# Patient Record
Sex: FEMALE | Race: WHITE | NOT HISPANIC OR LATINO | Employment: FULL TIME | URBAN - METROPOLITAN AREA
[De-identification: names, ages, dates, MRNs, and addresses within clinical notes are randomized per-mention and may not be internally consistent; named-entity substitution may affect disease eponyms.]

---

## 2021-06-07 ENCOUNTER — OFFICE VISIT (OUTPATIENT)
Dept: OBSTETRICS AND GYNECOLOGY | Facility: CLINIC | Age: 41
End: 2021-06-07
Payer: COMMERCIAL

## 2021-06-07 DIAGNOSIS — Z30.9 ENCOUNTER FOR CONTRACEPTIVE MANAGEMENT, UNSPECIFIED TYPE: Primary | ICD-10-CM

## 2021-06-07 PROCEDURE — 96372 PR INJECTION,THERAP/PROPH/DIAG2ST, IM OR SUBCUT: ICD-10-PCS | Mod: S$GLB,ICN,, | Performed by: OBSTETRICS & GYNECOLOGY

## 2021-06-07 PROCEDURE — 96372 THER/PROPH/DIAG INJ SC/IM: CPT | Mod: S$GLB,ICN,, | Performed by: OBSTETRICS & GYNECOLOGY

## 2021-06-07 RX ORDER — MEDROXYPROGESTERONE ACETATE 150 MG/ML
150 INJECTION, SUSPENSION INTRAMUSCULAR
Status: DISCONTINUED | OUTPATIENT
Start: 2021-06-07 | End: 2024-02-27

## 2021-06-07 RX ADMIN — MEDROXYPROGESTERONE ACETATE 150 MG: 150 INJECTION, SUSPENSION INTRAMUSCULAR at 01:06

## 2021-08-27 ENCOUNTER — OFFICE VISIT (OUTPATIENT)
Dept: OBSTETRICS AND GYNECOLOGY | Facility: CLINIC | Age: 41
End: 2021-08-27
Payer: COMMERCIAL

## 2021-08-27 DIAGNOSIS — Z30.9 ENCOUNTER FOR CONTRACEPTIVE MANAGEMENT, UNSPECIFIED TYPE: Primary | ICD-10-CM

## 2021-08-27 RX ORDER — MEDROXYPROGESTERONE ACETATE 150 MG/ML
150 INJECTION, SUSPENSION INTRAMUSCULAR
Status: COMPLETED | OUTPATIENT
Start: 2021-08-27 | End: 2021-08-27

## 2021-08-27 RX ADMIN — MEDROXYPROGESTERONE ACETATE 150 MG: 150 INJECTION, SUSPENSION INTRAMUSCULAR at 09:08

## 2021-08-27 NOTE — PROGRESS NOTES
1mL of Depo administered in left deltoid. Pt tolerated injection well.--------------------------------------------------------------------------------------------------------------------------------------------------------------------  MARKUS Sylvester

## 2021-11-10 ENCOUNTER — TELEPHONE (OUTPATIENT)
Dept: OBSTETRICS AND GYNECOLOGY | Facility: CLINIC | Age: 41
End: 2021-11-10
Payer: COMMERCIAL

## 2021-11-15 ENCOUNTER — CLINICAL SUPPORT (OUTPATIENT)
Dept: OBSTETRICS AND GYNECOLOGY | Facility: CLINIC | Age: 41
End: 2021-11-15
Payer: COMMERCIAL

## 2021-11-15 PROCEDURE — 96372 PR INJECTION,THERAP/PROPH/DIAG2ST, IM OR SUBCUT: ICD-10-PCS | Mod: S$GLB,ICN,, | Performed by: OBSTETRICS & GYNECOLOGY

## 2021-11-15 PROCEDURE — 96372 THER/PROPH/DIAG INJ SC/IM: CPT | Mod: S$GLB,ICN,, | Performed by: OBSTETRICS & GYNECOLOGY

## 2021-11-15 RX ADMIN — MEDROXYPROGESTERONE ACETATE 150 MG: 150 INJECTION, SUSPENSION INTRAMUSCULAR at 12:11

## 2022-02-21 ENCOUNTER — OFFICE VISIT (OUTPATIENT)
Dept: OBSTETRICS AND GYNECOLOGY | Facility: CLINIC | Age: 42
End: 2022-02-21
Payer: COMMERCIAL

## 2022-02-21 VITALS
HEIGHT: 67 IN | BODY MASS INDEX: 41.28 KG/M2 | SYSTOLIC BLOOD PRESSURE: 122 MMHG | DIASTOLIC BLOOD PRESSURE: 74 MMHG | WEIGHT: 263 LBS

## 2022-02-21 DIAGNOSIS — Z01.419 ENCOUNTER FOR ANNUAL ROUTINE GYNECOLOGICAL EXAMINATION: Primary | ICD-10-CM

## 2022-02-21 DIAGNOSIS — Z30.42 ENCOUNTER FOR SURVEILLANCE OF INJECTABLE CONTRACEPTIVE: ICD-10-CM

## 2022-02-21 PROCEDURE — 99396 PREV VISIT EST AGE 40-64: CPT | Mod: 25,S$GLB,, | Performed by: OBSTETRICS & GYNECOLOGY

## 2022-02-21 PROCEDURE — 3078F DIAST BP <80 MM HG: CPT | Mod: S$GLB,,, | Performed by: OBSTETRICS & GYNECOLOGY

## 2022-02-21 PROCEDURE — 3008F PR BODY MASS INDEX (BMI) DOCUMENTED: ICD-10-PCS | Mod: S$GLB,,, | Performed by: OBSTETRICS & GYNECOLOGY

## 2022-02-21 PROCEDURE — 96372 PR INJECTION,THERAP/PROPH/DIAG2ST, IM OR SUBCUT: ICD-10-PCS | Mod: S$GLB,,, | Performed by: OBSTETRICS & GYNECOLOGY

## 2022-02-21 PROCEDURE — 99396 PR PREVENTIVE VISIT,EST,40-64: ICD-10-PCS | Mod: 25,S$GLB,, | Performed by: OBSTETRICS & GYNECOLOGY

## 2022-02-21 PROCEDURE — 1159F PR MEDICATION LIST DOCUMENTED IN MEDICAL RECORD: ICD-10-PCS | Mod: S$GLB,,, | Performed by: OBSTETRICS & GYNECOLOGY

## 2022-02-21 PROCEDURE — 3074F SYST BP LT 130 MM HG: CPT | Mod: S$GLB,,, | Performed by: OBSTETRICS & GYNECOLOGY

## 2022-02-21 PROCEDURE — 3008F BODY MASS INDEX DOCD: CPT | Mod: S$GLB,,, | Performed by: OBSTETRICS & GYNECOLOGY

## 2022-02-21 PROCEDURE — 1160F PR REVIEW ALL MEDS BY PRESCRIBER/CLIN PHARMACIST DOCUMENTED: ICD-10-PCS | Mod: S$GLB,,, | Performed by: OBSTETRICS & GYNECOLOGY

## 2022-02-21 PROCEDURE — 1159F MED LIST DOCD IN RCRD: CPT | Mod: S$GLB,,, | Performed by: OBSTETRICS & GYNECOLOGY

## 2022-02-21 PROCEDURE — 3078F PR MOST RECENT DIASTOLIC BLOOD PRESSURE < 80 MM HG: ICD-10-PCS | Mod: S$GLB,,, | Performed by: OBSTETRICS & GYNECOLOGY

## 2022-02-21 PROCEDURE — 96372 THER/PROPH/DIAG INJ SC/IM: CPT | Mod: S$GLB,,, | Performed by: OBSTETRICS & GYNECOLOGY

## 2022-02-21 PROCEDURE — 3074F PR MOST RECENT SYSTOLIC BLOOD PRESSURE < 130 MM HG: ICD-10-PCS | Mod: S$GLB,,, | Performed by: OBSTETRICS & GYNECOLOGY

## 2022-02-21 PROCEDURE — 1160F RVW MEDS BY RX/DR IN RCRD: CPT | Mod: S$GLB,,, | Performed by: OBSTETRICS & GYNECOLOGY

## 2022-02-21 RX ORDER — BUPROPION HYDROCHLORIDE 300 MG/1
TABLET ORAL
COMMUNITY
Start: 2021-12-08

## 2022-02-21 RX ORDER — METOPROLOL TARTRATE 25 MG/1
25 TABLET, FILM COATED ORAL DAILY
COMMUNITY
Start: 2021-12-08

## 2022-02-21 RX ADMIN — MEDROXYPROGESTERONE ACETATE 150 MG: 150 INJECTION, SUSPENSION INTRAMUSCULAR at 01:02

## 2022-02-21 NOTE — PROGRESS NOTES
"Subjective:       Margaret Veloz is a 41 y.o. female here for a routine exam.  Current complaints:  Weight gain.  Personal health questionnaire reviewed: yes.     Gynecologic History  No LMP recorded. Patient has had an injection.  Contraception: Depo-Provera injections  Last Pap:  . Results were: normal  Last mammogram:  . Results were: normal    Obstetric History  OB History    Para Term  AB Living   2 1     1 1   SAB IAB Ectopic Multiple Live Births   1              # Outcome Date GA Lbr Homer/2nd Weight Sex Delivery Anes PTL Lv   2 SAB            1 Para      Vag-Spont            The following portions of the patient's history were reviewed and updated as appropriate: allergies, current medications, past family history, past medical history, past social history, past surgical history and problem list.    Review of Systems   ROS: (except as stated in HPI)  GENERAL: Denies weight gain or weight loss. Feeling well overall.   SKIN: Denies rash or lesions.   HEAD: Denies head injury or headache.   NODES: Denies enlarged lymph nodes.   CHEST: Denies chest pain or shortness of breath.   CARDIOVASCULAR: Denies palpitations or left sided chest pain.   ABDOMEN: No abdominal pain, constipation, diarrhea, nausea, vomiting or rectal bleeding.   URINARY: No frequency, dysuria, hematuria, or burning on urination.  REPRODUCTIVE: See HPI.   BREASTS: The patient performs breast self-examination and denies pain, lumps, or nipple discharge.   HEMATOLOGIC: No easy bruisability or excessive bleeding.   MUSCULOSKELETAL: Denies joint pain or swelling.   NEUROLOGIC: Denies syncope or weakness.   PSYCHIATRIC: Denies depression, anxiety or mood swings.         Objective:        /74 (BP Location: Right arm, Patient Position: Sitting)   Ht 5' 7" (1.702 m)   Wt 119.3 kg (263 lb)   BMI 41.19 kg/m²     General Appearance:    Alert, cooperative, no distress, appears stated age   Head:    Normocephalic, without " obvious abnormality, atraumatic   Eyes:    PERRL, conjunctiva/corneas clear, EOM's intact, fundi     benign, both eyes   Ears:    Normal TM's and external ear canals, both ears   Nose:   Nares normal, septum midline, mucosa normal, no drainage    or sinus tenderness   Throat:   Lips, mucosa, and tongue normal; teeth and gums normal   Neck:   Supple, symmetrical, trachea midline, no adenopathy;     thyroid:  no enlargement/tenderness/nodules; no carotid    bruit or JVD   Back:     Symmetric, no curvature, ROM normal, no CVA tenderness   Lungs:     Clear to auscultation bilaterally, respirations unlabored   Chest Wall:    No tenderness or deformity    Heart:    Regular rate and rhythm, S1 and S2 normal, no murmur, rub   or gallop   Breast Exam:    No tenderness, masses, or nipple abnormality   Abdomen:     Soft, non-tender, bowel sounds active all four quadrants,     no masses, no organomegaly   Genitalia:    Normal female without lesion, discharge or tenderness      Pelvic  Vv normal mucosa, without discharge or lesions  Cervix clear  Uterus anteverted normal size  Adnexa normal, without masses     Rectal:  Deferred   Extremities:   Extremities normal, atraumatic, no cyanosis or edema   Pulses:   2+ and symmetric all extremities   Skin:   Skin color, texture, turgor normal, no rashes or lesions   Lymph nodes:   Cervical, supraclavicular, and axillary nodes normal   Neurologic:   CNII-XII intact, normal strength, sensation and reflexes     throughout         Assessment:      Healthy female exam.      Plan:      Mammogram ordered.   Discussed contraceptive options.  Patient wishes to remain on Depo-Provera  Discussed weight gain.  She will discuss options with her PCP  Return 1 year or as needed

## 2022-05-23 ENCOUNTER — CLINICAL SUPPORT (OUTPATIENT)
Dept: OBSTETRICS AND GYNECOLOGY | Facility: CLINIC | Age: 42
End: 2022-05-23
Payer: COMMERCIAL

## 2022-05-23 PROCEDURE — 96372 PR INJECTION,THERAP/PROPH/DIAG2ST, IM OR SUBCUT: ICD-10-PCS | Mod: S$GLB,,, | Performed by: OBSTETRICS & GYNECOLOGY

## 2022-05-23 PROCEDURE — 96372 THER/PROPH/DIAG INJ SC/IM: CPT | Mod: S$GLB,,, | Performed by: OBSTETRICS & GYNECOLOGY

## 2022-05-23 RX ADMIN — MEDROXYPROGESTERONE ACETATE 150 MG: 150 INJECTION, SUSPENSION INTRAMUSCULAR at 03:05

## 2022-08-23 ENCOUNTER — CLINICAL SUPPORT (OUTPATIENT)
Dept: OBSTETRICS AND GYNECOLOGY | Facility: CLINIC | Age: 42
End: 2022-08-23
Payer: COMMERCIAL

## 2022-08-23 PROCEDURE — 96372 THER/PROPH/DIAG INJ SC/IM: CPT | Mod: S$GLB,,, | Performed by: OBSTETRICS & GYNECOLOGY

## 2022-08-23 PROCEDURE — 96372 PR INJECTION,THERAP/PROPH/DIAG2ST, IM OR SUBCUT: ICD-10-PCS | Mod: S$GLB,,, | Performed by: OBSTETRICS & GYNECOLOGY

## 2022-08-23 RX ADMIN — MEDROXYPROGESTERONE ACETATE 150 MG: 150 INJECTION, SUSPENSION INTRAMUSCULAR at 09:08

## 2022-11-23 ENCOUNTER — CLINICAL SUPPORT (OUTPATIENT)
Dept: OBSTETRICS AND GYNECOLOGY | Facility: CLINIC | Age: 42
End: 2022-11-23

## 2022-11-23 DIAGNOSIS — Z30.42 ENCOUNTER FOR SURVEILLANCE OF INJECTABLE CONTRACEPTIVE: Primary | ICD-10-CM

## 2022-11-23 PROCEDURE — 96372 PR INJECTION,THERAP/PROPH/DIAG2ST, IM OR SUBCUT: ICD-10-PCS | Mod: S$GLB,,, | Performed by: OBSTETRICS & GYNECOLOGY

## 2022-11-23 PROCEDURE — 96372 THER/PROPH/DIAG INJ SC/IM: CPT | Mod: S$GLB,,, | Performed by: OBSTETRICS & GYNECOLOGY

## 2022-11-23 RX ADMIN — MEDROXYPROGESTERONE ACETATE 150 MG: 150 INJECTION, SUSPENSION INTRAMUSCULAR at 09:11

## 2023-02-22 ENCOUNTER — CLINICAL SUPPORT (OUTPATIENT)
Dept: OBSTETRICS AND GYNECOLOGY | Facility: CLINIC | Age: 43
End: 2023-02-22

## 2023-02-22 PROCEDURE — 96372 THER/PROPH/DIAG INJ SC/IM: CPT | Mod: S$GLB,,, | Performed by: OBSTETRICS & GYNECOLOGY

## 2023-02-22 PROCEDURE — 96372 PR INJECTION,THERAP/PROPH/DIAG2ST, IM OR SUBCUT: ICD-10-PCS | Mod: S$GLB,,, | Performed by: OBSTETRICS & GYNECOLOGY

## 2023-02-22 RX ADMIN — MEDROXYPROGESTERONE ACETATE 150 MG: 150 INJECTION, SUSPENSION INTRAMUSCULAR at 09:02

## 2023-05-24 ENCOUNTER — CLINICAL SUPPORT (OUTPATIENT)
Dept: OBSTETRICS AND GYNECOLOGY | Facility: CLINIC | Age: 43
End: 2023-05-24

## 2023-05-24 PROCEDURE — 96372 PR INJECTION,THERAP/PROPH/DIAG2ST, IM OR SUBCUT: ICD-10-PCS | Mod: S$GLB,,, | Performed by: OBSTETRICS & GYNECOLOGY

## 2023-05-24 PROCEDURE — 96372 THER/PROPH/DIAG INJ SC/IM: CPT | Mod: S$GLB,,, | Performed by: OBSTETRICS & GYNECOLOGY

## 2023-05-24 RX ADMIN — MEDROXYPROGESTERONE ACETATE 150 MG: 150 INJECTION, SUSPENSION INTRAMUSCULAR at 09:05

## 2023-08-23 ENCOUNTER — CLINICAL SUPPORT (OUTPATIENT)
Dept: OBSTETRICS AND GYNECOLOGY | Facility: CLINIC | Age: 43
End: 2023-08-23

## 2023-08-23 DIAGNOSIS — Z30.42 DEPO-PROVERA CONTRACEPTIVE STATUS: Primary | ICD-10-CM

## 2023-08-23 PROCEDURE — 96372 PR INJECTION,THERAP/PROPH/DIAG2ST, IM OR SUBCUT: ICD-10-PCS | Mod: S$GLB,,, | Performed by: OBSTETRICS & GYNECOLOGY

## 2023-08-23 PROCEDURE — 96372 THER/PROPH/DIAG INJ SC/IM: CPT | Mod: S$GLB,,, | Performed by: OBSTETRICS & GYNECOLOGY

## 2023-08-23 RX ADMIN — MEDROXYPROGESTERONE ACETATE 150 MG: 150 INJECTION, SUSPENSION INTRAMUSCULAR at 09:08

## 2023-08-23 NOTE — PROGRESS NOTES
Patient presents for scheduled Depo-Provera injection.  UPT not done.  Medroxyprogesterone 150 mg IM given per provider order.  See MAR for further details.  Patient tolerated well.  No signs or symptoms of adverse reaction after 15 minutes.  Patient instructed to return for next dose in 3 months.  Pt to call back and scheduled annual visit. She no longer has insurance and wants to get everything situated before scheduling

## 2023-11-27 ENCOUNTER — CLINICAL SUPPORT (OUTPATIENT)
Dept: OBSTETRICS AND GYNECOLOGY | Facility: CLINIC | Age: 43
End: 2023-11-27

## 2023-11-27 DIAGNOSIS — Z30.42 ENCOUNTER FOR SURVEILLANCE OF INJECTABLE CONTRACEPTIVE: Primary | ICD-10-CM

## 2023-11-27 PROCEDURE — 96372 PR INJECTION,THERAP/PROPH/DIAG2ST, IM OR SUBCUT: ICD-10-PCS | Mod: S$GLB,,, | Performed by: OBSTETRICS & GYNECOLOGY

## 2023-11-27 PROCEDURE — 96372 THER/PROPH/DIAG INJ SC/IM: CPT | Mod: S$GLB,,, | Performed by: OBSTETRICS & GYNECOLOGY

## 2023-11-27 RX ADMIN — MEDROXYPROGESTERONE ACETATE 150 MG: 150 INJECTION, SUSPENSION INTRAMUSCULAR at 11:11

## 2023-11-27 NOTE — PROGRESS NOTES
Patient presents for scheduled Depo-Provera injection.  UPT not done, Pt on her period.  Medroxyprogesterone 150 mg IM given per provider order.  See MAR for further details.  Patient tolerated well.  No signs or symptoms of adverse reaction after 15 minutes.  Patient instructed to return for next dose in 3 months.

## 2024-07-02 ENCOUNTER — OFFICE VISIT (OUTPATIENT)
Dept: FAMILY MEDICINE | Facility: CLINIC | Age: 44
End: 2024-07-02
Payer: COMMERCIAL

## 2024-07-02 ENCOUNTER — LAB VISIT (OUTPATIENT)
Dept: LAB | Facility: CLINIC | Age: 44
End: 2024-07-02
Payer: COMMERCIAL

## 2024-07-02 VITALS
HEIGHT: 67 IN | WEIGHT: 238 LBS | BODY MASS INDEX: 37.35 KG/M2 | OXYGEN SATURATION: 99 % | HEART RATE: 83 BPM | SYSTOLIC BLOOD PRESSURE: 118 MMHG | DIASTOLIC BLOOD PRESSURE: 68 MMHG

## 2024-07-02 DIAGNOSIS — E11.65 TYPE 2 DIABETES MELLITUS WITH HYPERGLYCEMIA, WITHOUT LONG-TERM CURRENT USE OF INSULIN: ICD-10-CM

## 2024-07-02 DIAGNOSIS — Z76.89 ENCOUNTER TO ESTABLISH CARE: Primary | ICD-10-CM

## 2024-07-02 DIAGNOSIS — Z11.59 NEED FOR HEPATITIS C SCREENING TEST: ICD-10-CM

## 2024-07-02 DIAGNOSIS — F90.0 ATTENTION DEFICIT HYPERACTIVITY DISORDER (ADHD), PREDOMINANTLY INATTENTIVE TYPE: ICD-10-CM

## 2024-07-02 DIAGNOSIS — Z11.4 ENCOUNTER FOR SCREENING FOR HIV: ICD-10-CM

## 2024-07-02 DIAGNOSIS — R00.0 TACHYCARDIA: ICD-10-CM

## 2024-07-02 DIAGNOSIS — Z76.89 ENCOUNTER TO ESTABLISH CARE: ICD-10-CM

## 2024-07-02 LAB
AMP AMPHETAMINE 1000 NM/ML POC: ABNORMAL
BAR BARBITURATES 300 NG/ML POC: NEGATIVE
BUP BUPRENORPHINE 10 NG/ML POC: NEGATIVE
BZO BENZODIAZEPINES 300 NG/ML POC: NEGATIVE
COC COCAINE 300 NG/ML POC: NEGATIVE
CREATININE (CR) POC: NEGATIVE
CTP QC/QA: YES
MET METHAMPHETAMINE 1000 NG/ML POC: NEGATIVE
MOP/OPI300 MORPHINE 300 NG/ML POC: NEGATIVE
MTD METHADONE 300 NG/ML POC: NEGATIVE
OXIDANT (OX) POC: NEGATIVE
OXY OXYCODONE 100 NG/ML POC: NEGATIVE
SPECIFIC GRAVITY (SG) POC: 1
TEMPERATURE (°F) POC: 96
THC MARIJUANA 50 NG/ML POC: NEGATIVE

## 2024-07-02 PROCEDURE — 80053 COMPREHEN METABOLIC PANEL: CPT | Performed by: STUDENT IN AN ORGANIZED HEALTH CARE EDUCATION/TRAINING PROGRAM

## 2024-07-02 PROCEDURE — 87389 HIV-1 AG W/HIV-1&-2 AB AG IA: CPT | Performed by: STUDENT IN AN ORGANIZED HEALTH CARE EDUCATION/TRAINING PROGRAM

## 2024-07-02 PROCEDURE — 83036 HEMOGLOBIN GLYCOSYLATED A1C: CPT | Performed by: STUDENT IN AN ORGANIZED HEALTH CARE EDUCATION/TRAINING PROGRAM

## 2024-07-02 PROCEDURE — 99214 OFFICE O/P EST MOD 30 MIN: CPT | Mod: S$GLB,,, | Performed by: STUDENT IN AN ORGANIZED HEALTH CARE EDUCATION/TRAINING PROGRAM

## 2024-07-02 PROCEDURE — 80305 DRUG TEST PRSMV DIR OPT OBS: CPT | Mod: QW,S$GLB,, | Performed by: STUDENT IN AN ORGANIZED HEALTH CARE EDUCATION/TRAINING PROGRAM

## 2024-07-02 PROCEDURE — 1159F MED LIST DOCD IN RCRD: CPT | Mod: CPTII,S$GLB,, | Performed by: STUDENT IN AN ORGANIZED HEALTH CARE EDUCATION/TRAINING PROGRAM

## 2024-07-02 PROCEDURE — 80061 LIPID PANEL: CPT | Performed by: STUDENT IN AN ORGANIZED HEALTH CARE EDUCATION/TRAINING PROGRAM

## 2024-07-02 PROCEDURE — 36415 COLL VENOUS BLD VENIPUNCTURE: CPT | Mod: ,,, | Performed by: STUDENT IN AN ORGANIZED HEALTH CARE EDUCATION/TRAINING PROGRAM

## 2024-07-02 PROCEDURE — 1160F RVW MEDS BY RX/DR IN RCRD: CPT | Mod: CPTII,S$GLB,, | Performed by: STUDENT IN AN ORGANIZED HEALTH CARE EDUCATION/TRAINING PROGRAM

## 2024-07-02 PROCEDURE — 86803 HEPATITIS C AB TEST: CPT | Performed by: STUDENT IN AN ORGANIZED HEALTH CARE EDUCATION/TRAINING PROGRAM

## 2024-07-02 PROCEDURE — 3008F BODY MASS INDEX DOCD: CPT | Mod: CPTII,S$GLB,, | Performed by: STUDENT IN AN ORGANIZED HEALTH CARE EDUCATION/TRAINING PROGRAM

## 2024-07-02 PROCEDURE — 3074F SYST BP LT 130 MM HG: CPT | Mod: CPTII,S$GLB,, | Performed by: STUDENT IN AN ORGANIZED HEALTH CARE EDUCATION/TRAINING PROGRAM

## 2024-07-02 PROCEDURE — 3078F DIAST BP <80 MM HG: CPT | Mod: CPTII,S$GLB,, | Performed by: STUDENT IN AN ORGANIZED HEALTH CARE EDUCATION/TRAINING PROGRAM

## 2024-07-02 RX ORDER — FOLIC ACID 0.8 MG
800 TABLET ORAL DAILY
COMMUNITY

## 2024-07-02 RX ORDER — DEXTROAMPHETAMINE SACCHARATE, AMPHETAMINE ASPARTATE MONOHYDRATE, DEXTROAMPHETAMINE SULFATE AND AMPHETAMINE SULFATE 2.5; 2.5; 2.5; 2.5 MG/1; MG/1; MG/1; MG/1
10 CAPSULE, EXTENDED RELEASE ORAL EVERY MORNING
Qty: 30 CAPSULE | Refills: 0 | Status: SHIPPED | OUTPATIENT
Start: 2024-08-02

## 2024-07-02 RX ORDER — DEXTROAMPHETAMINE SACCHARATE, AMPHETAMINE ASPARTATE MONOHYDRATE, DEXTROAMPHETAMINE SULFATE AND AMPHETAMINE SULFATE 2.5; 2.5; 2.5; 2.5 MG/1; MG/1; MG/1; MG/1
10 CAPSULE, EXTENDED RELEASE ORAL EVERY MORNING
COMMUNITY
End: 2024-07-02 | Stop reason: SDUPTHER

## 2024-07-02 RX ORDER — SAXAGLIPTIN AND METFORMIN HYDROCHLORIDE 1000; 2.5 MG/1; MG/1
1 TABLET, FILM COATED, EXTENDED RELEASE ORAL 2 TIMES DAILY
Qty: 180 TABLET | Refills: 3 | Status: SHIPPED | OUTPATIENT
Start: 2024-07-02

## 2024-07-02 RX ORDER — DEXTROAMPHETAMINE SACCHARATE, AMPHETAMINE ASPARTATE MONOHYDRATE, DEXTROAMPHETAMINE SULFATE AND AMPHETAMINE SULFATE 2.5; 2.5; 2.5; 2.5 MG/1; MG/1; MG/1; MG/1
10 CAPSULE, EXTENDED RELEASE ORAL EVERY MORNING
Qty: 30 CAPSULE | Refills: 0 | Status: SHIPPED | OUTPATIENT
Start: 2024-09-02

## 2024-07-02 RX ORDER — LANOLIN ALCOHOL/MO/W.PET/CERES
100 CREAM (GRAM) TOPICAL DAILY
COMMUNITY

## 2024-07-02 RX ORDER — MULTIVITAMIN
1 TABLET ORAL DAILY
COMMUNITY

## 2024-07-02 RX ORDER — ASPIRIN 325 MG
325 TABLET, DELAYED RELEASE (ENTERIC COATED) ORAL DAILY
COMMUNITY

## 2024-07-02 RX ORDER — METOPROLOL TARTRATE 25 MG/1
25 TABLET, FILM COATED ORAL DAILY
Qty: 90 TABLET | Refills: 3 | Status: SHIPPED | OUTPATIENT
Start: 2024-07-02

## 2024-07-02 RX ORDER — DEXTROAMPHETAMINE SACCHARATE, AMPHETAMINE ASPARTATE MONOHYDRATE, DEXTROAMPHETAMINE SULFATE AND AMPHETAMINE SULFATE 2.5; 2.5; 2.5; 2.5 MG/1; MG/1; MG/1; MG/1
10 CAPSULE, EXTENDED RELEASE ORAL EVERY MORNING
Qty: 30 CAPSULE | Refills: 0 | Status: SHIPPED | OUTPATIENT
Start: 2024-07-02

## 2024-07-02 RX ORDER — SAXAGLIPTIN AND METFORMIN HYDROCHLORIDE 1000; 2.5 MG/1; MG/1
1 TABLET, FILM COATED, EXTENDED RELEASE ORAL 2 TIMES DAILY
COMMUNITY
End: 2024-07-02

## 2024-07-02 NOTE — PROGRESS NOTES
"  [facility]    [facility]  [address]  [place] [address]    Subjective     Patient ID: Margaret Veloz is a 44 y.o. female who comes to the clinic to establish care.    Chief Complaint: Est Care    T2DM - last A1c of 11.1, takes saxagliptan-meformin 2.5mg-1000mg    Chronic knee pain - takes ASA 325mg daily    B12 deficiency - takes b12 supplement    ADHD - takes adderall 10mg XR daily    Birth control - takes depo every 3 months    ROS negative unless stated above       Objective     Vitals:    07/02/24 1544   BP: 118/68   Pulse: 83   SpO2: 99%   Weight: 108 kg (238 lb)   Height: 5' 7" (1.702 m)        Wt Readings from Last 3 Encounters:   07/02/24 1544 108 kg (238 lb)   02/27/24 1121 117 kg (258 lb)   02/21/22 0944 119.3 kg (263 lb)        Physical Exam  Vitals reviewed.   Constitutional:       Appearance: Normal appearance. She is obese.   HENT:      Head: Normocephalic and atraumatic.   Eyes:      Extraocular Movements: Extraocular movements intact.      Pupils: Pupils are equal, round, and reactive to light.   Cardiovascular:      Rate and Rhythm: Normal rate.   Pulmonary:      Effort: Pulmonary effort is normal. No respiratory distress.   Musculoskeletal:         General: Normal range of motion.      Cervical back: Normal range of motion and neck supple.   Skin:     General: Skin is dry.      Capillary Refill: Capillary refill takes less than 2 seconds.   Neurological:      General: No focal deficit present.      Mental Status: She is alert and oriented to person, place, and time. Mental status is at baseline.   Psychiatric:         Mood and Affect: Mood normal.         Behavior: Behavior normal.         Thought Content: Thought content normal.         Current Outpatient Medications   Medication Instructions    aspirin (ECOTRIN) 325 mg, Oral, Daily    cyanocobalamin (VITAMIN B-12) 100 mcg, Oral, Daily    dextroamphetamine-amphetamine (ADDERALL " XR) 10 MG 24 hr capsule 10 mg, Oral, Every morning    [START ON 8/2/2024] dextroamphetamine-amphetamine (ADDERALL XR) 10 MG 24 hr capsule 10 mg, Oral, Every morning    [START ON 9/2/2024] dextroamphetamine-amphetamine (ADDERALL XR) 10 MG 24 hr capsule 10 mg, Oral, Every morning    folic acid (FOLVITE) 800 mcg, Oral, Daily    medroxyPROGESTERone (DEPO-PROVERA) 150 mg, Intramuscular, Every 3 months, Please include needles    metoprolol tartrate (LOPRESSOR) 25 mg, Oral, Daily    multivitamin (ONE DAILY MULTIVITAMIN) per tablet 1 tablet, Oral, Daily    SAXagliptin-metformin (KOMBIGLYZE XR) 2.5-1,000 mg TM24 1 tablet, Oral, 2 times daily           Assessment and Plan     1. Encounter to establish care  -     Lipid panel; Future; Expected date: 07/02/2024  -     Hemoglobin A1c; Future; Expected date: 07/02/2024  -     Comprehensive metabolic panel; Future; Expected date: 07/02/2024    2. Encounter for screening for HIV  -     HIV 1/2 Ag/Ab (4th Gen); Future; Expected date: 07/02/2024    3. Need for hepatitis C screening test  -     Hepatitis C antibody; Future; Expected date: 07/02/2024    4. Attention deficit hyperactivity disorder (ADHD), predominantly inattentive type  -     dextroamphetamine-amphetamine (ADDERALL XR) 10 MG 24 hr capsule; Take 1 capsule (10 mg total) by mouth every morning.  Dispense: 30 capsule; Refill: 0  -     dextroamphetamine-amphetamine (ADDERALL XR) 10 MG 24 hr capsule; Take 1 capsule (10 mg total) by mouth every morning.  Dispense: 30 capsule; Refill: 0  -     dextroamphetamine-amphetamine (ADDERALL XR) 10 MG 24 hr capsule; Take 1 capsule (10 mg total) by mouth every morning.  Dispense: 30 capsule; Refill: 0  -     POCT Urine Drug Screen (With BUP)    5. Type 2 diabetes mellitus with hyperglycemia, without long-term current use of insulin  -     Ambulatory referral/consult to Diabetes Education; Future; Expected date: 07/09/2024  -     SAXagliptin-metformin (KOMBIGLYZE XR) 2.5-1,000 mg TM24;  Take 1 tablet by mouth 2 (two) times a day.  Dispense: 180 tablet; Refill: 3    6. Tachycardia  -     metoprolol tartrate (LOPRESSOR) 25 MG tablet; Take 1 tablet (25 mg total) by mouth once daily.  Dispense: 90 tablet; Refill: 3        Here to establish care. Also needs refills    For T2DM, checking A1c, doubling saxagliptan-metformin dose given poor control, diabetes educator ordered    For tachycardia, continue metoprolol    For ADHD, refilling adderall. PDMP checked w/ no concerning findings. UDS today    Labs as above    RTC in 3 months for adderall and diabetes control         I encouraged the patient to take all medications as prescribed and to keep follow up appointments with their providers. Patient stated they had no other concerns. Questions were invited and answered. Follow up sooner if needed.     Serge Last MD  07/02/2024 3:59 PM

## 2024-07-03 LAB
ALBUMIN SERPL BCP-MCNC: 4.2 G/DL (ref 3.5–5.2)
ALP SERPL-CCNC: 70 U/L (ref 55–135)
ALT SERPL W/O P-5'-P-CCNC: 40 U/L (ref 10–44)
ANION GAP SERPL CALC-SCNC: 12 MMOL/L (ref 8–16)
AST SERPL-CCNC: 28 U/L (ref 10–40)
BILIRUB SERPL-MCNC: 0.6 MG/DL (ref 0.1–1)
BUN SERPL-MCNC: 15 MG/DL (ref 6–20)
CALCIUM SERPL-MCNC: 9.9 MG/DL (ref 8.7–10.5)
CHLORIDE SERPL-SCNC: 110 MMOL/L (ref 95–110)
CHOLEST SERPL-MCNC: 185 MG/DL (ref 120–199)
CHOLEST/HDLC SERPL: 3.4 {RATIO} (ref 2–5)
CO2 SERPL-SCNC: 19 MMOL/L (ref 23–29)
CREAT SERPL-MCNC: 1.3 MG/DL (ref 0.5–1.4)
EST. GFR  (NO RACE VARIABLE): 52.3 ML/MIN/1.73 M^2
ESTIMATED AVG GLUCOSE: 157 MG/DL (ref 68–131)
GLUCOSE SERPL-MCNC: 115 MG/DL (ref 70–110)
HBA1C MFR BLD: 7.1 % (ref 4–5.6)
HCV AB SERPL QL IA: NORMAL
HDLC SERPL-MCNC: 55 MG/DL (ref 40–75)
HDLC SERPL: 29.7 % (ref 20–50)
HIV 1+2 AB+HIV1 P24 AG SERPL QL IA: NORMAL
LDLC SERPL CALC-MCNC: 111.4 MG/DL (ref 63–159)
NONHDLC SERPL-MCNC: 130 MG/DL
POTASSIUM SERPL-SCNC: 4.4 MMOL/L (ref 3.5–5.1)
PROT SERPL-MCNC: 8 G/DL (ref 6–8.4)
SODIUM SERPL-SCNC: 141 MMOL/L (ref 136–145)
TRIGL SERPL-MCNC: 93 MG/DL (ref 30–150)

## 2024-07-08 ENCOUNTER — PATIENT MESSAGE (OUTPATIENT)
Dept: FAMILY MEDICINE | Facility: CLINIC | Age: 44
End: 2024-07-08
Payer: COMMERCIAL

## 2024-07-08 ENCOUNTER — TELEPHONE (OUTPATIENT)
Dept: FAMILY MEDICINE | Facility: CLINIC | Age: 44
End: 2024-07-08
Payer: COMMERCIAL

## 2024-07-15 ENCOUNTER — TELEPHONE (OUTPATIENT)
Dept: FAMILY MEDICINE | Facility: CLINIC | Age: 44
End: 2024-07-15
Payer: COMMERCIAL

## 2024-07-15 DIAGNOSIS — E11.65 TYPE 2 DIABETES MELLITUS WITH HYPERGLYCEMIA, WITHOUT LONG-TERM CURRENT USE OF INSULIN: ICD-10-CM

## 2024-07-15 RX ORDER — SAXAGLIPTIN AND METFORMIN HYDROCHLORIDE 1000; 2.5 MG/1; MG/1
1 TABLET, FILM COATED, EXTENDED RELEASE ORAL 2 TIMES DAILY
Qty: 180 TABLET | Refills: 3 | Status: SHIPPED | OUTPATIENT
Start: 2024-07-15 | End: 2024-07-15 | Stop reason: SDUPTHER

## 2024-07-15 RX ORDER — SAXAGLIPTIN AND METFORMIN HYDROCHLORIDE 1000; 2.5 MG/1; MG/1
1 TABLET, FILM COATED, EXTENDED RELEASE ORAL 2 TIMES DAILY
Qty: 180 TABLET | Refills: 3 | Status: SHIPPED | OUTPATIENT
Start: 2024-07-15

## 2024-07-15 NOTE — TELEPHONE ENCOUNTER
Serge Last MD Rashid, Carlinda, LPN  Caller: Unspecified (Today,  8:35 AM)  Please let the patient know I printed it and it's at our , ty        [facility] Dr. Last,  Please review message below from this patient concerning the Rx for SAXagliptin-Metformin (Kombiglyze) XR 2.5-1,000 mg TM24.  Patient states this Rx has been on back order and the only pharmacy that has this med in stock is [facility]. Rx cannot be E-scribed by requesting written.   Please call when Rx is ready for .  Last office visit: 7/2/2024  Thank you.  Signed:  Amy Auguste LPN    ----- Message from Estela Negron sent at 7/12/2024  4:21 PM CDT -----  Type: Needs Medical Advice  Who Called:  pt  Best Call Back Number: [phone]  Additional Information: pt is calling in regards to needing a paper Rx for SAXagliptin-metformin (KOMBIGLYZE XR) 2.5-1,000 mg TM24 so that she can get it filled at a specific phamaracy. Pt says all other pharmacies does not have the Rx. Please call back and advise. Thanks!

## 2024-07-16 ENCOUNTER — TELEPHONE (OUTPATIENT)
Dept: DIABETES | Facility: CLINIC | Age: 44
End: 2024-07-16
Payer: COMMERCIAL

## 2024-07-17 ENCOUNTER — CLINICAL SUPPORT (OUTPATIENT)
Dept: DIABETES | Facility: CLINIC | Age: 44
End: 2024-07-17
Payer: COMMERCIAL

## 2024-07-17 VITALS — BODY MASS INDEX: 37.06 KG/M2 | HEIGHT: 67 IN | WEIGHT: 236.13 LBS

## 2024-07-17 DIAGNOSIS — E11.65 TYPE 2 DIABETES MELLITUS WITH HYPERGLYCEMIA, WITHOUT LONG-TERM CURRENT USE OF INSULIN: ICD-10-CM

## 2024-07-17 PROCEDURE — G0108 DIAB MANAGE TRN  PER INDIV: HCPCS | Mod: S$GLB,,, | Performed by: DIETITIAN, REGISTERED

## 2024-07-17 NOTE — PROGRESS NOTES
"Diabetes Care Specialist Progress Note  Author: Bela Domingo RD  Date: 7/17/2024    Intake    Program Intake  Reason for Diabetes Program Visit:: Initial Diabetes Assessment  Current diabetes risk level:: moderate  In the last 12 months, have you:: none  Permission to speak with others about care:: yes (patient's boyfriend present today)    Current Diabetes Treatment: Oral Medications  Oral Medication Type/Dose: Saxaglipitin-Metformin (Kombiglyze XR) 2.5-1,000 mg two times per day    Patient is able to identify current diabetes medications, dosages, and appropriate timing of medications.: yes  Patient reports problems or concerns with current medication regimen.: no  Patient is  aware that some diabetes medications can cause low blood sugar?: Yes  Medication Skills Assessment Completed:: Yes  Assessment indicates:: Adequate understanding  Area of need?: No    Continuous Glucose Monitoring  Patient has CGM: No    Lab Results   Component Value Date    HGBA1C 7.1 (H) 07/02/2024       Weight: 107.1 kg (236 lb 1.6 oz)   Height: 5' 7" (170.2 cm)   Body mass index is 36.98 kg/m².    Lifestyle Coping Support & Clinical    Lifestyle/Coping/Support  Compared to other people your age, how would you rate your health?: Good  Does anyone in your family have diabetes or does anyone in your family support you in your diabetes care?: mother, uncle, dad, aunts, Boyfriend is present here with patient today  List anything about Diabetes that causes you stress?: not understanding it  How do you deal with stress/distress?: "poorly" per patient  Learning Barriers:: Other (Comment) (history of ADHD)  Culture or Hindu beliefs that may impact ability to access healthcare: No  Psychosocial/Coping Skills Assessment Completed: : Yes  Assessment indicates:: Adequate understanding  Area of need?: No         Diabetes Self-Management Skills Assessment    Diabetes Disease Process/Treatment Options  Diabetes Type?: Type II  When were you " diagnosed?: 2 months ago  If previous diabetes education, when/where:: [facility] a couple of weeks ago  What are your goals for this education session?: to learn more about it all  Is patient aware of what causes diabetes?: Yes  Does patient understand the pathophysiology of diabetes?: No  Diabetes Disease Process/Treatment Options: Skills Assessment Completed: Yes  Assessment indicates:: Adequate understanding  Area of need?: Yes    Nutrition/Healthy Eating  Meal Plan 24 Hour Recall - Breakfast: 2 fried eggs  Meal Plan 24 Hour Recall - Lunch: tuna salad stuffed in lower carb morteza bread  Meal Plan 24 Hour Recall - Dinner: 2 ground chicken tacos on carb smart tortillas  Meal Plan 24 Hour Recall - Snack: sunflower seeds, Greek yogurt daily with nuts, pork rinds, sugar jello with cool whip  Meal Plan 24 Hour Recall - Beverage: lots of water, coffee with 1/2 and 1/2, occasional Mt Dew Zero  Who shops/cooks?: patient does both  Patient can identify foods that impact blood sugar.: yes  Challenges to healthy eating:: other (see comments) (no real issues)  Nutrition/Healthy Eating Skills Assessment Completed:: Yes  Assessment indicates:: Adequate understanding  Area of need?: Yes    Physical Activity/Exercise  Patient's daily activity level:: moderately active  Patient formally exercises outside of work.: yes  Frequency: four or more times a week  Patient can identify forms of physical activity.: yes  Physical Activity/Exercise Skills Assessment Completed: : Yes  Assessment indicates:: Adequate understanding  Area of need?: Yes    Home Blood Glucose Monitoring  Patient states that blood sugar is checked at home daily.: yes  Monitoring Method:: home glucometer  Home glucometer meter type:: Reli On Platinum  Fasting BG range history:: 103-153          What are your blood glucose targets?:  in morning, less than 180 two hours after meals  How often do you check your blood sugar?: just fasting in the  morning here lately  What is your A1c Target?: less than 7%  Home Blood Glucose Monitoring Skills Assessment Completed: : Yes  Assessment indicates:: Adequate understanding  Area of need?: No    Acute Complications  Have you ever had hypoglycemia (low BG 70 or less)?: no  Do you know the symptoms of low blood sugar and how to treat?: keeps regular hard candy on her  Have you ever had hyperglycemia (high  or more)?: no   Do you know the symptoms of high blood sugar and how to treat: no  Have you ever had DKA?: no  Do you ever test for ketones?: no  Do you have a sick day plan?: no  Acute Complications Skills Assessment Completed: : Yes  Assessment indicates:: Adequate understanding  Area of need?: Yes    Chronic Complications  Reviewed health maintenance: yes  Have you completed your annual diabetes maintenance labwork? : yes  Do you examine your feet daily?: yes  Has your doctor examined your feet?: no  Do you see a Dentist?: yes  Dentist date of last visit:: January 2024  Do you see an eye doctor?: yes  Eye doctor date of last visit:: 2 years ago  Chronic Complications Skills Assessment Completed: : Yes  Assessment indicates:: Adequate understanding  Area of need?: Yes      Assessment Summary and Plan    Based on today's diabetes care assessment, the following areas of need were identified:          7/17/2024    12:01 AM   Areas of Need   Medications/Current Diabetes Treatment No   Lifestyle Coping Support No   Diabetes Disease Process/Treatment Options Yes- Discussed pathology of Type 2 diabetes, risk factors and treatment options.    Nutrition/Healthy Eating Yes- Care plan added   Physical Activity/Exercise Yes- Care plan added   Home Blood Glucose Monitoring No   Acute Complications Yes- Reviewed blood glucose goals, prevention, detection, signs and symptoms, and treatment of hypoglycemia and hyperglycemia, and when to contact the clinic.   Chronic Complications Yes- Discussed importance of A1c less than 7  to reduce risk of micro and macro complications, including nephropathy, neuropathy, retinopathy, heart attack and stroke. Reviewed the importance of controlled Blood Pressure and Cholesterol Lab Values in preventing disease.   Health maintenance reviewed       Today's interventions were provided through individual discussion, instruction, and written materials were provided.      Patient verbalized understanding of instruction and written materials.  Pt was able to return back demonstration of instructions today. Patient understood key points, needs reinforcement and further instruction.     Diabetes Self-Management Care Plan:    Today's Diabetes Self-Management Care Plan was developed with Margaret's input. Margaret has agreed to work toward the following goal(s) to improve his/her overall diabetes control.      Care Plan: Diabetes Management   Updates made since 6/17/2024 12:00 AM        Problem: Healthy Eating         Goal: Eat 3-6 meals/snacks daily with 15-45g/1-3 servings of Carbohydrate per meal/snack.    Start Date: 7/17/2024   Expected End Date: 10/16/2024   Priority: High   Barriers: No Barriers Identified   Note:    7/17/2024- Patient reports sticking to a low carb diet for a while now, even before diagnosis, something like 50 grams or less carb per day. Discussed patient could have more carbs throughout the day if desired. Discussed that her liver may be over outputting glucose due to low amounts of carbs, especially overnight.            Task: Review the importance of balancing carbohydrates with each meal using portion control techniques to count servings of carbohydrate and label reading to identify serving size and amount of total carbs per serving. Completed 7/17/2024          Problem: Physical Activity and Exercise         Goal: Patient agrees to increase physical activity to taking exercise classes, working on more cardio but getting at least 10,000 steps per day.    Start Date: 7/17/2024   Expected  End Date: 10/16/2024   Priority: Medium   Barriers: No Barriers Identified   Note:    7/17/2024- Patient fairly active already but not as much as she was in the past. Does a little yoga/stretching daily and walks. Wanting to start going back to the gym regularly. Discussed minimum goals and encouraged this.       Follow Up Plan     Follow up in 13 weeks (on 10/16/2024) for progress towards goals set.    Today's care plan and follow up schedule was discussed with patient.  Margaret verbalized understanding of the care plan, goals, and agrees to follow up plan.        The patient was encouraged to communicate with his/her health care provider/physician and care team regarding his/her condition(s) and treatment.  I provided the patient with my contact information today and encouraged to contact me via phone or [facility] as needed.     Length of Visit   Total Time: 90 Minutes

## 2024-09-18 ENCOUNTER — PATIENT MESSAGE (OUTPATIENT)
Dept: FAMILY MEDICINE | Facility: CLINIC | Age: 44
End: 2024-09-18
Payer: COMMERCIAL

## 2024-10-02 ENCOUNTER — OFFICE VISIT (OUTPATIENT)
Dept: FAMILY MEDICINE | Facility: CLINIC | Age: 44
End: 2024-10-02
Payer: COMMERCIAL

## 2024-10-02 VITALS
SYSTOLIC BLOOD PRESSURE: 112 MMHG | DIASTOLIC BLOOD PRESSURE: 68 MMHG | WEIGHT: 231 LBS | OXYGEN SATURATION: 98 % | HEIGHT: 67 IN | BODY MASS INDEX: 36.26 KG/M2 | RESPIRATION RATE: 20 BRPM

## 2024-10-02 DIAGNOSIS — E11.65 TYPE 2 DIABETES MELLITUS WITH HYPERGLYCEMIA, WITHOUT LONG-TERM CURRENT USE OF INSULIN: ICD-10-CM

## 2024-10-02 DIAGNOSIS — F90.0 ATTENTION DEFICIT HYPERACTIVITY DISORDER (ADHD), PREDOMINANTLY INATTENTIVE TYPE: ICD-10-CM

## 2024-10-02 DIAGNOSIS — Z12.31 ENCOUNTER FOR SCREENING MAMMOGRAM FOR MALIGNANT NEOPLASM OF BREAST: Primary | ICD-10-CM

## 2024-10-02 LAB
AMP AMPHETAMINE 1000 NM/ML POC: ABNORMAL
BAR BARBITURATES 300 NG/ML POC: NEGATIVE
BUP BUPRENORPHINE 10 NG/ML POC: NEGATIVE
BZO BENZODIAZEPINES 300 NG/ML POC: NEGATIVE
COC COCAINE 300 NG/ML POC: NEGATIVE
CREATININE (CR) POC: 200
CTP QC/QA: YES
MET METHAMPHETAMINE 1000 NG/ML POC: NEGATIVE
MOP/OPI300 MORPHINE 300 NG/ML POC: NEGATIVE
MTD METHADONE 300 NG/ML POC: NEGATIVE
OXIDANT (OX) POC: NEGATIVE
OXY OXYCODONE 100 NG/ML POC: NEGATIVE
SPECIFIC GRAVITY (SG) POC: 1.01
TEMPERATURE (°F) POC: 92
THC MARIJUANA 50 NG/ML POC: NEGATIVE

## 2024-10-02 PROCEDURE — 3078F DIAST BP <80 MM HG: CPT | Mod: CPTII,S$GLB,, | Performed by: STUDENT IN AN ORGANIZED HEALTH CARE EDUCATION/TRAINING PROGRAM

## 2024-10-02 PROCEDURE — 80305 DRUG TEST PRSMV DIR OPT OBS: CPT | Mod: QW,S$GLB,, | Performed by: STUDENT IN AN ORGANIZED HEALTH CARE EDUCATION/TRAINING PROGRAM

## 2024-10-02 PROCEDURE — 3008F BODY MASS INDEX DOCD: CPT | Mod: CPTII,S$GLB,, | Performed by: STUDENT IN AN ORGANIZED HEALTH CARE EDUCATION/TRAINING PROGRAM

## 2024-10-02 PROCEDURE — G2211 COMPLEX E/M VISIT ADD ON: HCPCS | Mod: S$GLB,,, | Performed by: STUDENT IN AN ORGANIZED HEALTH CARE EDUCATION/TRAINING PROGRAM

## 2024-10-02 PROCEDURE — 1160F RVW MEDS BY RX/DR IN RCRD: CPT | Mod: CPTII,S$GLB,, | Performed by: STUDENT IN AN ORGANIZED HEALTH CARE EDUCATION/TRAINING PROGRAM

## 2024-10-02 PROCEDURE — 1159F MED LIST DOCD IN RCRD: CPT | Mod: CPTII,S$GLB,, | Performed by: STUDENT IN AN ORGANIZED HEALTH CARE EDUCATION/TRAINING PROGRAM

## 2024-10-02 PROCEDURE — 3074F SYST BP LT 130 MM HG: CPT | Mod: CPTII,S$GLB,, | Performed by: STUDENT IN AN ORGANIZED HEALTH CARE EDUCATION/TRAINING PROGRAM

## 2024-10-02 PROCEDURE — 3051F HG A1C>EQUAL 7.0%<8.0%: CPT | Mod: CPTII,S$GLB,, | Performed by: STUDENT IN AN ORGANIZED HEALTH CARE EDUCATION/TRAINING PROGRAM

## 2024-10-02 PROCEDURE — 99214 OFFICE O/P EST MOD 30 MIN: CPT | Mod: S$GLB,,, | Performed by: STUDENT IN AN ORGANIZED HEALTH CARE EDUCATION/TRAINING PROGRAM

## 2024-10-02 RX ORDER — DEXTROAMPHETAMINE SACCHARATE, AMPHETAMINE ASPARTATE MONOHYDRATE, DEXTROAMPHETAMINE SULFATE AND AMPHETAMINE SULFATE 5; 5; 5; 5 MG/1; MG/1; MG/1; MG/1
20 CAPSULE, EXTENDED RELEASE ORAL EVERY MORNING
Qty: 30 CAPSULE | Refills: 0 | Status: SHIPPED | OUTPATIENT
Start: 2024-10-02 | End: 2024-10-02

## 2024-10-02 RX ORDER — DEXTROAMPHETAMINE SACCHARATE, AMPHETAMINE ASPARTATE MONOHYDRATE, DEXTROAMPHETAMINE SULFATE AND AMPHETAMINE SULFATE 5; 5; 5; 5 MG/1; MG/1; MG/1; MG/1
20 CAPSULE, EXTENDED RELEASE ORAL DAILY
Qty: 30 CAPSULE | Refills: 0 | Status: SHIPPED | OUTPATIENT
Start: 2024-12-02 | End: 2025-01-01

## 2024-10-02 RX ORDER — DEXTROAMPHETAMINE SACCHARATE, AMPHETAMINE ASPARTATE MONOHYDRATE, DEXTROAMPHETAMINE SULFATE AND AMPHETAMINE SULFATE 5; 5; 5; 5 MG/1; MG/1; MG/1; MG/1
20 CAPSULE, EXTENDED RELEASE ORAL DAILY
Qty: 30 CAPSULE | Refills: 0 | Status: SHIPPED | OUTPATIENT
Start: 2024-11-02 | End: 2024-10-02

## 2024-10-02 RX ORDER — DEXTROAMPHETAMINE SACCHARATE, AMPHETAMINE ASPARTATE MONOHYDRATE, DEXTROAMPHETAMINE SULFATE AND AMPHETAMINE SULFATE 5; 5; 5; 5 MG/1; MG/1; MG/1; MG/1
20 CAPSULE, EXTENDED RELEASE ORAL DAILY
Qty: 30 CAPSULE | Refills: 0 | Status: SHIPPED | OUTPATIENT
Start: 2024-11-02 | End: 2024-12-02

## 2024-10-02 RX ORDER — DEXTROAMPHETAMINE SACCHARATE, AMPHETAMINE ASPARTATE MONOHYDRATE, DEXTROAMPHETAMINE SULFATE AND AMPHETAMINE SULFATE 5; 5; 5; 5 MG/1; MG/1; MG/1; MG/1
20 CAPSULE, EXTENDED RELEASE ORAL DAILY
Qty: 30 CAPSULE | Refills: 0 | Status: SHIPPED | OUTPATIENT
Start: 2024-10-02 | End: 2024-11-01

## 2024-10-02 RX ORDER — DEXTROAMPHETAMINE SACCHARATE, AMPHETAMINE ASPARTATE MONOHYDRATE, DEXTROAMPHETAMINE SULFATE AND AMPHETAMINE SULFATE 5; 5; 5; 5 MG/1; MG/1; MG/1; MG/1
20 CAPSULE, EXTENDED RELEASE ORAL DAILY
Qty: 30 CAPSULE | Refills: 0 | Status: SHIPPED | OUTPATIENT
Start: 2024-12-02 | End: 2024-10-02

## 2024-10-02 NOTE — PROGRESS NOTES
"  [facility]    [facility]  [address]  [place] [address]    Subjective     Patient ID: Margaret Veloz is a 44 y.o. female who comes to the clinic for a follow up visit.    Chief Complaint: Follow-up (Follow up med refills )    T2DM - last A1c of 7.1, takes saxagliptan-meformin 2.5mg-1000mg BID    Chronic knee pain - takes ASA 325mg daily     B12 deficiency - takes b12 supplement     ADHD - takes adderall 10mg XR daily     Birth control - takes depo every 3 months    ROS negative unless stated above       Objective     Vitals:    10/02/24 1040   BP: 112/68   BP Location: Left arm   Patient Position: Sitting   Resp: 20   SpO2: 98%   Weight: 104.8 kg (231 lb)   Height: 5' 7" (1.702 m)        Wt Readings from Last 3 Encounters:   10/02/24 1040 104.8 kg (231 lb)   07/17/24 1231 107.1 kg (236 lb 1.6 oz)   07/02/24 1544 108 kg (238 lb)        Physical Exam  Constitutional:       General: She is not in acute distress.     Appearance: Normal appearance. She is not ill-appearing.   HENT:      Head: Normocephalic and atraumatic.      Mouth/Throat:      Mouth: Mucous membranes are moist.   Eyes:      Extraocular Movements: Extraocular movements intact.      Pupils: Pupils are equal, round, and reactive to light.   Cardiovascular:      Rate and Rhythm: Normal rate.   Pulmonary:      Effort: Pulmonary effort is normal. No respiratory distress.   Musculoskeletal:         General: Normal range of motion.      Cervical back: Normal range of motion and neck supple.   Skin:     General: Skin is warm and dry.   Neurological:      General: No focal deficit present.      Mental Status: She is alert and oriented to person, place, and time. Mental status is at baseline.   Psychiatric:         Mood and Affect: Mood normal.         Behavior: Behavior normal.         Thought Content: Thought content normal.         Current Outpatient Medications   Medication Instructions    " aspirin (ECOTRIN) 325 mg, Daily    cyanocobalamin (VITAMIN B-12) 100 mcg, Daily    [START ON 12/2/2024] dextroamphetamine-amphetamine (ADDERALL XR) 20 MG 24 hr capsule 20 mg, Oral, Daily    [START ON 11/2/2024] dextroamphetamine-amphetamine (ADDERALL XR) 20 MG 24 hr capsule 20 mg, Oral, Daily    dextroamphetamine-amphetamine (ADDERALL XR) 20 MG 24 hr capsule 20 mg, Oral, Every morning    folic acid (FOLVITE) 800 mcg, Daily    medroxyPROGESTERone (DEPO-PROVERA) 150 mg, Intramuscular, Every 3 months, Please include needles    metoprolol tartrate (LOPRESSOR) 25 mg, Oral, Daily    multivitamin (ONE DAILY MULTIVITAMIN) per tablet 1 tablet, Daily    SAXagliptin-metformin (KOMBIGLYZE XR) 2.5-1,000 mg TM24 1 tablet, Oral, 2 times daily           Assessment and Plan     1. Encounter for screening mammogram for malignant neoplasm of breast  -     Mammo Digital Screening Bilat w/ Geoffrey; Future; Expected date: 10/02/2024    2. Attention deficit hyperactivity disorder (ADHD), predominantly inattentive type  -     dextroamphetamine-amphetamine (ADDERALL XR) 20 MG 24 hr capsule; Take 1 capsule (20 mg total) by mouth once daily.  Dispense: 30 capsule; Refill: 0  -     dextroamphetamine-amphetamine (ADDERALL XR) 20 MG 24 hr capsule; Take 1 capsule (20 mg total) by mouth once daily.  Dispense: 30 capsule; Refill: 0  -     dextroamphetamine-amphetamine (ADDERALL XR) 20 MG 24 hr capsule; Take 1 capsule (20 mg total) by mouth every morning.  Dispense: 30 capsule; Refill: 0  -     POCT Urine Drug Screen (With BUP)        Here for follow up.    Refilling adderall, went over SE's, PDMP checked w/ no concerning findings    For T2DM, continue Kombiglyze XR BID    Continue other meds as prescribed    Ordering mammo at  in [facility]    The visit today included increased complexity associated with the care of an episodic problem, namely diabetes, that was addressed and managed via longitudinal care.    RTC in 3 months, A1c and BMP        I  encouraged the patient to take all medications as prescribed and to keep follow up appointments with their providers. ED precautions given for more concerning issues. Questions were invited and answered. Patient stated they had no other concerns. Follow up sooner if needed.     Serge Last MD  10/02/2024 10:44 AM

## 2024-10-10 ENCOUNTER — PATIENT MESSAGE (OUTPATIENT)
Dept: ADMINISTRATIVE | Facility: HOSPITAL | Age: 44
End: 2024-10-10
Payer: COMMERCIAL

## 2024-10-22 ENCOUNTER — PATIENT OUTREACH (OUTPATIENT)
Dept: DIABETES | Facility: CLINIC | Age: 44
End: 2024-10-22
Payer: COMMERCIAL

## 2024-10-23 ENCOUNTER — NUTRITION (OUTPATIENT)
Dept: DIABETES | Facility: CLINIC | Age: 44
End: 2024-10-23
Payer: COMMERCIAL

## 2024-10-23 VITALS — WEIGHT: 233.44 LBS | HEIGHT: 67 IN | BODY MASS INDEX: 36.64 KG/M2

## 2024-10-23 DIAGNOSIS — E11.65 TYPE 2 DIABETES MELLITUS WITH HYPERGLYCEMIA, WITHOUT LONG-TERM CURRENT USE OF INSULIN: Primary | ICD-10-CM

## 2024-10-23 NOTE — PROGRESS NOTES
"Diabetes Care Specialist Follow-up Note  Author: Bela Domingo RD  Date: 10/23/2024    Intake    Program Intake  Reason for Diabetes Program Visit:: Intervention  Type of Intervention:: Individual  Individual: Education  Education: Self-Management Skill Review, Nutrition and Meal Planning    Current Diabetes Treatment: Oral Medications  Oral Medication Type/Dose: Saxaglipitin-Metformin (Kombiglyze XR) 2.5-1,000 mg two times per day         Lab Results   Component Value Date    HGBA1C 7.1 (H) 07/02/2024           Weight: 105.9 kg (233 lb 7.5 oz)   Height: 5' 7" (170.2 cm)   Body mass index is 36.57 kg/m².  Wt loss of 3 to 5 lbs since last visit on 7/17/2024      Physical activity/Exercise:   See care plan below    SMBG: Home glucometer  Fasting, 105-135, 139        Diabetes Self-Management Skills Assessment    Nutrition/Healthy Eating  Meal Plan 24 Hour Recall - Breakfast: cheese stick and turkey sticks  Meal Plan 24 Hour Recall - Lunch: fish plate with 3 pieces of fish, mashed potatoes and greens, but more often has tuna or chicken salad with crackers  Meal Plan 24 Hour Recall - Dinner: chicken with onions, peppers and cheese sauce with some beans and sald with gaucamole  Meal Plan 24 Hour Recall - Snack: hard boiled eggs, cheese and turkey sticks, jerky, veggie straws, better cheddar crackers, almonds  Meal Plan 24 Hour Recall - Beverage: lots of water, lots of coffee with half and half and some Splenda, Mt Dew Zero        Today's interventions were provided through individual discussion, instruction, and written materials were provided.    Patient verbalized understanding of instruction and written materials.  Pt was able to return back demonstration of instructions today. Patient understood key points, needs reinforcement and further instruction.     Diabetes Self-Management Care Plan Review and Evaluation of Progress:    During today's follow-up Rudys Diabetes Self-Management Care Plan progress was reviewed and " progress was evaluated including his/her input. Margaret has agreed to continue his/her journey to improve/maintain overall diabetes control by continuing to set health goals. See care plan progress below.      Care Plan: Diabetes Management   Updates made since 9/23/2024 12:00 AM        Problem: Healthy Eating         Goal: Eat 3-6 meals/snacks daily with 15-45g/1-3 servings of Carbohydrate per meal/snack.    Start Date: 7/17/2024   Expected End Date: 4/25/2025   This Visit's Progress: Met   Priority: High   Barriers: No Barriers Identified   Note:    7/17/2024- Patient reports sticking to a low carb diet for a while now, even before diagnosis, something like 50 grams or less carb per day.     10/23/2024- Patient reports being a little off schedule this week but usually plans meals out for the week and eats mostly at home. Still being very mindful of carb intake but does allow herself a little treat here and there.  Patient has had some small weight loss anywhere from 3-5 pounds. She reports feeling differently this week but when she saw PCP on 10/2 she was done 5#.       Problem: Physical Activity and Exercise         Goal: Patient agrees to increase physical activity to taking exercise classes, working on more cardio but getting at least 10,000 steps per day.    Start Date: 7/17/2024   Expected End Date: 4/25/2025   This Visit's Progress: Met   Priority: Medium   Barriers: No Barriers Identified   Note:    7/17/2024- Patient fairly active already but not as much as she was in the past. Does a little yoga/stretching daily and walks. Wanting to start going back to the gym regularly. Encouraged this.    10/23/2024- Patient has not started going back to the gym but has ran two to three 5K since initial visit. Still doing yoga/stretching and getting about 10,000 steps a day. Patient does report wanting to start at the gym to get some weights and such in.          Follow Up Plan     Follow up in 6 months (on 4/25/2025)  for progress towards goals set.    Today's care plan and follow up schedule was discussed with patient.  Margaret verbalized understanding of the care plan, goals, and agrees to follow up plan.        The patient was encouraged to communicate with his/her health care provider/physician and care team regarding his/her condition(s) and treatment.  I provided the patient with my contact information today and encouraged to contact me via phone or [facility] as needed.     Length of Visit   Total Time: 60 Minutes

## 2024-11-22 DIAGNOSIS — E11.9 TYPE 2 DIABETES MELLITUS WITHOUT COMPLICATION: ICD-10-CM

## 2024-12-04 ENCOUNTER — PATIENT MESSAGE (OUTPATIENT)
Dept: FAMILY MEDICINE | Facility: CLINIC | Age: 44
End: 2024-12-04
Payer: COMMERCIAL

## 2025-01-02 ENCOUNTER — PATIENT MESSAGE (OUTPATIENT)
Dept: FAMILY MEDICINE | Facility: CLINIC | Age: 45
End: 2025-01-02

## 2025-01-02 ENCOUNTER — OFFICE VISIT (OUTPATIENT)
Dept: FAMILY MEDICINE | Facility: CLINIC | Age: 45
End: 2025-01-02
Payer: COMMERCIAL

## 2025-01-02 ENCOUNTER — HOSPITAL ENCOUNTER (OUTPATIENT)
Dept: RADIOLOGY | Facility: HOSPITAL | Age: 45
Discharge: HOME | End: 2025-01-02
Attending: STUDENT IN AN ORGANIZED HEALTH CARE EDUCATION/TRAINING PROGRAM
Payer: COMMERCIAL

## 2025-01-02 VITALS
SYSTOLIC BLOOD PRESSURE: 114 MMHG | RESPIRATION RATE: 18 BRPM | BODY MASS INDEX: 36.15 KG/M2 | WEIGHT: 230.31 LBS | DIASTOLIC BLOOD PRESSURE: 68 MMHG | HEIGHT: 67 IN | OXYGEN SATURATION: 99 % | HEART RATE: 92 BPM

## 2025-01-02 DIAGNOSIS — E11.65 TYPE 2 DIABETES MELLITUS WITH HYPERGLYCEMIA, WITHOUT LONG-TERM CURRENT USE OF INSULIN: Primary | ICD-10-CM

## 2025-01-02 DIAGNOSIS — S69.92XA INJURY OF LEFT HAND, INITIAL ENCOUNTER: Primary | ICD-10-CM

## 2025-01-02 DIAGNOSIS — M79.642 LEFT HAND PAIN: ICD-10-CM

## 2025-01-02 DIAGNOSIS — F90.0 ATTENTION DEFICIT HYPERACTIVITY DISORDER (ADHD), PREDOMINANTLY INATTENTIVE TYPE: ICD-10-CM

## 2025-01-02 DIAGNOSIS — M79.641 RIGHT HAND PAIN: ICD-10-CM

## 2025-01-02 PROCEDURE — G2211 COMPLEX E/M VISIT ADD ON: HCPCS | Mod: S$GLB,,, | Performed by: STUDENT IN AN ORGANIZED HEALTH CARE EDUCATION/TRAINING PROGRAM

## 2025-01-02 PROCEDURE — 73130 X-RAY EXAM OF HAND: CPT | Mod: TC,LT

## 2025-01-02 PROCEDURE — 3074F SYST BP LT 130 MM HG: CPT | Mod: CPTII,S$GLB,, | Performed by: STUDENT IN AN ORGANIZED HEALTH CARE EDUCATION/TRAINING PROGRAM

## 2025-01-02 PROCEDURE — 1159F MED LIST DOCD IN RCRD: CPT | Mod: CPTII,S$GLB,, | Performed by: STUDENT IN AN ORGANIZED HEALTH CARE EDUCATION/TRAINING PROGRAM

## 2025-01-02 PROCEDURE — 3078F DIAST BP <80 MM HG: CPT | Mod: CPTII,S$GLB,, | Performed by: STUDENT IN AN ORGANIZED HEALTH CARE EDUCATION/TRAINING PROGRAM

## 2025-01-02 PROCEDURE — 73130 X-RAY EXAM OF HAND: CPT | Mod: 26,LT,, | Performed by: RADIOLOGY

## 2025-01-02 PROCEDURE — 3008F BODY MASS INDEX DOCD: CPT | Mod: CPTII,S$GLB,, | Performed by: STUDENT IN AN ORGANIZED HEALTH CARE EDUCATION/TRAINING PROGRAM

## 2025-01-02 PROCEDURE — 99214 OFFICE O/P EST MOD 30 MIN: CPT | Mod: S$GLB,,, | Performed by: STUDENT IN AN ORGANIZED HEALTH CARE EDUCATION/TRAINING PROGRAM

## 2025-01-02 PROCEDURE — 1160F RVW MEDS BY RX/DR IN RCRD: CPT | Mod: CPTII,S$GLB,, | Performed by: STUDENT IN AN ORGANIZED HEALTH CARE EDUCATION/TRAINING PROGRAM

## 2025-01-02 RX ORDER — DEXTROAMPHETAMINE SACCHARATE, AMPHETAMINE ASPARTATE MONOHYDRATE, DEXTROAMPHETAMINE SULFATE AND AMPHETAMINE SULFATE 5; 5; 5; 5 MG/1; MG/1; MG/1; MG/1
20 CAPSULE, EXTENDED RELEASE ORAL EVERY MORNING
Qty: 30 CAPSULE | Refills: 0 | Status: SHIPPED | OUTPATIENT
Start: 2025-02-02

## 2025-01-02 RX ORDER — DEXTROAMPHETAMINE SACCHARATE, AMPHETAMINE ASPARTATE MONOHYDRATE, DEXTROAMPHETAMINE SULFATE AND AMPHETAMINE SULFATE 5; 5; 5; 5 MG/1; MG/1; MG/1; MG/1
20 CAPSULE, EXTENDED RELEASE ORAL EVERY MORNING
Qty: 30 CAPSULE | Refills: 0 | Status: SHIPPED | OUTPATIENT
Start: 2025-03-03

## 2025-01-02 RX ORDER — ATORVASTATIN CALCIUM 20 MG/1
20 TABLET, FILM COATED ORAL DAILY
Qty: 90 TABLET | Refills: 3 | Status: SHIPPED | OUTPATIENT
Start: 2025-01-02 | End: 2026-01-02

## 2025-01-02 RX ORDER — SAXAGLIPTIN AND METFORMIN HYDROCHLORIDE 1000; 2.5 MG/1; MG/1
1 TABLET, FILM COATED, EXTENDED RELEASE ORAL 2 TIMES DAILY
Qty: 180 TABLET | Refills: 3 | Status: SHIPPED | OUTPATIENT
Start: 2025-01-02 | End: 2026-01-02

## 2025-01-02 RX ORDER — DEXTROAMPHETAMINE SACCHARATE, AMPHETAMINE ASPARTATE MONOHYDRATE, DEXTROAMPHETAMINE SULFATE AND AMPHETAMINE SULFATE 5; 5; 5; 5 MG/1; MG/1; MG/1; MG/1
20 CAPSULE, EXTENDED RELEASE ORAL DAILY
Qty: 30 CAPSULE | Refills: 0 | Status: SHIPPED | OUTPATIENT
Start: 2025-01-02 | End: 2025-02-01
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
## 2025-03-10 ENCOUNTER — PATIENT MESSAGE (OUTPATIENT)
Dept: ADMINISTRATIVE | Facility: HOSPITAL | Age: 45
End: 2025-03-10
Payer: COMMERCIAL

## 2025-03-27 DIAGNOSIS — F90.0 ATTENTION DEFICIT HYPERACTIVITY DISORDER (ADHD), PREDOMINANTLY INATTENTIVE TYPE: ICD-10-CM

## 2025-03-27 RX ORDER — DEXTROAMPHETAMINE SACCHARATE, AMPHETAMINE ASPARTATE MONOHYDRATE, DEXTROAMPHETAMINE SULFATE AND AMPHETAMINE SULFATE 5; 5; 5; 5 MG/1; MG/1; MG/1; MG/1
20 CAPSULE, EXTENDED RELEASE ORAL EVERY MORNING
Qty: 30 CAPSULE | Refills: 0 | OUTPATIENT
Start: 2025-03-27

## 2025-03-27 NOTE — TELEPHONE ENCOUNTER
No care due was identified.  [facility] Embedded Care Due Messages. Reference number: [id number].   3/27/2025 3:22:38 PM CDT

## 2025-03-27 NOTE — TELEPHONE ENCOUNTER
Last office visit: 1/2/2025  ----- Message from Eden sent at 3/27/2025  3:10 PM CDT -----  Type:  Needs Medical AdviceWho Called: Jayne name and phone #:  [facility] - [place], [place] - [address] [place] [address]Pwgwn: [phone] Fax: [phone] the patient rather a call back or a response via [facility]? Please callBe Call Back Number: [phone] Information: pt needs to make an appt to be seen to get refills on her  dextroamphetamine-amphetamine (ADDERALL XR) 20 MG 24 hr capsule  Please call back to advise. Thanks!

## 2025-03-28 ENCOUNTER — OFFICE VISIT (OUTPATIENT)
Dept: FAMILY MEDICINE | Facility: CLINIC | Age: 45
End: 2025-03-28
Payer: COMMERCIAL

## 2025-03-28 VITALS
HEIGHT: 67 IN | HEART RATE: 91 BPM | SYSTOLIC BLOOD PRESSURE: 118 MMHG | DIASTOLIC BLOOD PRESSURE: 77 MMHG | BODY MASS INDEX: 38.14 KG/M2 | OXYGEN SATURATION: 99 % | RESPIRATION RATE: 20 BRPM | WEIGHT: 243 LBS

## 2025-03-28 DIAGNOSIS — F90.0 ATTENTION DEFICIT HYPERACTIVITY DISORDER (ADHD), PREDOMINANTLY INATTENTIVE TYPE: ICD-10-CM

## 2025-03-28 RX ORDER — DEXTROAMPHETAMINE SACCHARATE, AMPHETAMINE ASPARTATE MONOHYDRATE, DEXTROAMPHETAMINE SULFATE AND AMPHETAMINE SULFATE 5; 5; 5; 5 MG/1; MG/1; MG/1; MG/1
20 CAPSULE, EXTENDED RELEASE ORAL EVERY MORNING
Qty: 30 CAPSULE | Refills: 0 | Status: SHIPPED | OUTPATIENT
Start: 2025-03-28

## 2025-03-28 NOTE — PROGRESS NOTES
"  [facility]  [address], [address]  [place] [address]    Subjective     Patient ID: Margaret Veloz is a 44 y.o. female who comes to the clinic for a follow up visit.    Chief Complaint: managment of chronic condition (Follow up)    Type 2 Diabetes - takes saxagliptan-metformin 2.5-1000mg BID     ADHD - takes adderall 20mg daily    ROS negative unless stated above       Objective     Vitals:    03/28/25 1341   BP: 118/77   BP Location: Right arm   Patient Position: Sitting   Pulse: 91   Resp: 20   SpO2: 99%   Weight: 110.2 kg (243 lb)   Height: 5' 7" (1.702 m)        Wt Readings from Last 3 Encounters:   03/28/25 1341 110.2 kg (243 lb)   02/28/25 1214 109.8 kg (242 lb)   01/02/25 0910 104.5 kg (230 lb 4.8 oz)        Physical Exam  Constitutional:       General: She is not in acute distress.     Appearance: Normal appearance. She is obese. She is not ill-appearing.   HENT:      Head: Normocephalic and atraumatic.      Mouth/Throat:      Mouth: Mucous membranes are moist.   Eyes:      Extraocular Movements: Extraocular movements intact.      Pupils: Pupils are equal, round, and reactive to light.   Cardiovascular:      Rate and Rhythm: Normal rate.   Pulmonary:      Effort: Pulmonary effort is normal. No respiratory distress.   Musculoskeletal:         General: Normal range of motion.      Cervical back: Normal range of motion and neck supple.   Skin:     General: Skin is warm and dry.   Neurological:      General: No focal deficit present.      Mental Status: She is alert and oriented to person, place, and time. Mental status is at baseline.   Psychiatric:         Mood and Affect: Mood normal.         Behavior: Behavior normal.         Thought Content: Thought content normal.         Current Outpatient Medications   Medication Instructions    aspirin (ECOTRIN) 325 mg, Daily    atorvastatin (LIPITOR) 20 mg, Oral, Daily    cyanocobalamin (VITAMIN " B-12) 100 mcg, Daily    dextroamphetamine-amphetamine (ADDERALL XR) 20 MG 24 hr capsule 20 mg, Oral, Every morning    dextroamphetamine-amphetamine (ADDERALL XR) 20 MG 24 hr capsule 20 mg, Oral, Every morning    dextroamphetamine-amphetamine (ADDERALL XR) 20 MG 24 hr capsule 20 mg, Oral, Every morning    folic acid (FOLVITE) 800 mcg, Daily    medroxyPROGESTERone (DEPO-PROVERA) 150 mg, Intramuscular, Every 12 weeks    metoprolol tartrate (LOPRESSOR) 25 mg, Oral, Daily    multivitamin (ONE DAILY MULTIVITAMIN) per tablet 1 tablet, Daily    SAXagliptin-metformin (KOMBIGLYZE XR) 2.5-1,000 mg TM24 1 each, Oral, 2 times daily           Assessment and Plan     1. Attention deficit hyperactivity disorder (ADHD), predominantly inattentive type  -     dextroamphetamine-amphetamine (ADDERALL XR) 20 MG 24 hr capsule; Take 1 capsule (20 mg total) by mouth every morning.  Dispense: 30 capsule; Refill: 0  -     dextroamphetamine-amphetamine (ADDERALL XR) 20 MG 24 hr capsule; Take 1 capsule (20 mg total) by mouth every morning.  Dispense: 30 capsule; Refill: 0  -     dextroamphetamine-amphetamine (ADDERALL XR) 20 MG 24 hr capsule; Take 1 capsule (20 mg total) by mouth every morning.  Dispense: 30 capsule; Refill: 0        Here for follow up.    For ADHD, refilling meds as above, PDMP checked w/ no concerning findings    For Diabetes, continue regimen    The visit today included increased complexity associated with the care of an episodic problem, namely T2DM, that was addressed and managed via longitudinal care.    RTC in 3 months, ADHD        I encouraged the patient to take all medications as prescribed and to keep follow up appointments with their providers. ED precautions given for more concerning issues. Questions were invited and answered. Patient stated they had no other concerns. Follow up sooner if needed.     Serge Last MD  03/28/2025 1:46 PM

## 2025-06-11 ENCOUNTER — PATIENT OUTREACH (OUTPATIENT)
Dept: ADMINISTRATIVE | Facility: HOSPITAL | Age: 45
End: 2025-06-11
Payer: COMMERCIAL

## 2025-06-11 NOTE — LETTER
AUTHORIZATION FOR RELEASE OF   CONFIDENTIAL INFORMATION    Dear Dr Suero,    We are seeing Margaret Veloz, date of birth 1980, in the clinic at [facility]. Serge Last MD is the patient's PCP. Margaret Veloz has an outstanding lab/procedure at the time we reviewed her chart. In order to help keep her health information updated, she has authorized us to request the following medical record(s):        (  )  MAMMOGRAM                                      (  )  COLONOSCOPY      (  )  PAP SMEAR                                          (  )  OUTSIDE LAB RESULTS     (  )  DEXA SCAN                                          ( X )  EYE EXAM            (  )  FOOT EXAM                                          (  )  ENTIRE RECORD     (  )  OUTSIDE IMMUNIZATIONS                 (  )  _______________         Please fax records to [facility], McLarty, Michael, MD at [phone]    Thanks so much and have a great day!    Briana Pinon LPN [facility]  [address] [place] [address]  - [phone]  F- [phone]           Patient Name: Margaret Veloz  : 1980  Patient Phone #: [phone]

## 2025-06-11 NOTE — PROGRESS NOTES
Population Health Chart Review & Patient Outreach Details      Additional Pop Health Notes:               Updates Requested / Reviewed:      Updated Care Coordination Note         Health Maintenance Topics Overdue:      [facility] Score: 3     Urine Screening  Eye Exam  Foot Exam                       Health Maintenance Topic(s) Outreach Outcomes & Actions Taken:    Eye Exam - Outreach Outcomes & Actions Taken  : External Records Requested & Care Team Updated if Applicable

## 2025-06-12 ENCOUNTER — PATIENT MESSAGE (OUTPATIENT)
Dept: ADMINISTRATIVE | Facility: HOSPITAL | Age: 45
End: 2025-06-12
Payer: COMMERCIAL

## 2025-06-18 ENCOUNTER — TELEPHONE (OUTPATIENT)
Dept: FAMILY MEDICINE | Facility: CLINIC | Age: 45
End: 2025-06-18
Payer: COMMERCIAL

## 2025-06-18 NOTE — TELEPHONE ENCOUNTER
Type:  Sooner Apoointment Request    Caller is requesting a sooner appointment. Caller is requesting a message be sent to doctor.    Name of Caller: pt     When is the first available appointment? Not shown     Symptoms:    Would the patient rather a call back or a response via [facility]? Call    Best Call Back Number:[phone]     Additional Information: pt is requesting a sooner appt due to work conflict on 06/27/2025.

## 2025-06-27 ENCOUNTER — OFFICE VISIT (OUTPATIENT)
Dept: FAMILY MEDICINE | Facility: CLINIC | Age: 45
End: 2025-06-27
Payer: COMMERCIAL

## 2025-06-27 VITALS
WEIGHT: 246.94 LBS | SYSTOLIC BLOOD PRESSURE: 115 MMHG | HEART RATE: 90 BPM | RESPIRATION RATE: 18 BRPM | DIASTOLIC BLOOD PRESSURE: 60 MMHG | OXYGEN SATURATION: 99 % | HEIGHT: 67 IN | BODY MASS INDEX: 38.76 KG/M2

## 2025-06-27 DIAGNOSIS — M72.2 PLANTAR FASCIITIS: ICD-10-CM

## 2025-06-27 DIAGNOSIS — E11.69 HYPERLIPIDEMIA ASSOCIATED WITH TYPE 2 DIABETES MELLITUS: ICD-10-CM

## 2025-06-27 DIAGNOSIS — G47.00 INSOMNIA, UNSPECIFIED TYPE: ICD-10-CM

## 2025-06-27 DIAGNOSIS — E78.5 HYPERLIPIDEMIA ASSOCIATED WITH TYPE 2 DIABETES MELLITUS: ICD-10-CM

## 2025-06-27 DIAGNOSIS — E11.65 TYPE 2 DIABETES MELLITUS WITH HYPERGLYCEMIA, WITHOUT LONG-TERM CURRENT USE OF INSULIN: Primary | ICD-10-CM

## 2025-06-27 DIAGNOSIS — F90.0 ATTENTION DEFICIT HYPERACTIVITY DISORDER (ADHD), PREDOMINANTLY INATTENTIVE TYPE: ICD-10-CM

## 2025-06-27 PROCEDURE — 83036 HEMOGLOBIN GLYCOSYLATED A1C: CPT | Performed by: STUDENT IN AN ORGANIZED HEALTH CARE EDUCATION/TRAINING PROGRAM

## 2025-06-27 PROCEDURE — 80053 COMPREHEN METABOLIC PANEL: CPT | Performed by: STUDENT IN AN ORGANIZED HEALTH CARE EDUCATION/TRAINING PROGRAM

## 2025-06-27 PROCEDURE — 85025 COMPLETE CBC W/AUTO DIFF WBC: CPT | Performed by: STUDENT IN AN ORGANIZED HEALTH CARE EDUCATION/TRAINING PROGRAM

## 2025-06-27 PROCEDURE — 80061 LIPID PANEL: CPT | Performed by: STUDENT IN AN ORGANIZED HEALTH CARE EDUCATION/TRAINING PROGRAM

## 2025-06-27 RX ORDER — DEXTROAMPHETAMINE SACCHARATE, AMPHETAMINE ASPARTATE MONOHYDRATE, DEXTROAMPHETAMINE SULFATE AND AMPHETAMINE SULFATE 5; 5; 5; 5 MG/1; MG/1; MG/1; MG/1
20 CAPSULE, EXTENDED RELEASE ORAL EVERY MORNING
Qty: 30 CAPSULE | Refills: 0 | Status: SHIPPED | OUTPATIENT
Start: 2025-07-27 | End: 2025-08-26

## 2025-06-27 RX ORDER — DEXTROAMPHETAMINE SACCHARATE, AMPHETAMINE ASPARTATE MONOHYDRATE, DEXTROAMPHETAMINE SULFATE AND AMPHETAMINE SULFATE 5; 5; 5; 5 MG/1; MG/1; MG/1; MG/1
20 CAPSULE, EXTENDED RELEASE ORAL EVERY MORNING
Qty: 30 CAPSULE | Refills: 0 | Status: SHIPPED | OUTPATIENT
Start: 2025-08-26 | End: 2025-09-25

## 2025-06-27 RX ORDER — TRAZODONE HYDROCHLORIDE 100 MG/1
100 TABLET ORAL NIGHTLY
Qty: 90 TABLET | Refills: 1 | Status: SHIPPED | OUTPATIENT
Start: 2025-06-27 | End: 2026-06-27

## 2025-06-27 RX ORDER — DEXTROAMPHETAMINE SACCHARATE, AMPHETAMINE ASPARTATE MONOHYDRATE, DEXTROAMPHETAMINE SULFATE AND AMPHETAMINE SULFATE 5; 5; 5; 5 MG/1; MG/1; MG/1; MG/1
20 CAPSULE, EXTENDED RELEASE ORAL EVERY MORNING
Qty: 30 CAPSULE | Refills: 0 | Status: SHIPPED | OUTPATIENT
Start: 2025-06-27 | End: 2025-07-27

## 2025-06-27 NOTE — PROGRESS NOTES
"  [facility]    [facility]  [address]  [place] [address]    Subjective     Patient ID: Margaret Veloz is a 45 y.o. female who comes to the clinic for a follow up visit.    Chief Complaint: Health Maintenance (3 mos f/u)    Type 2 Diabetes - takes saxagliptan-metformin 2.5-1000mg BID     ADHD - takes adderall 20mg daily    Tachycardia - takes metoprolol 25mg daily    ROS negative unless stated above       Objective     Vitals:    06/27/25 1301   BP: 115/60   BP Location: Right arm   Patient Position: Sitting   Pulse: 90   Resp: 18   SpO2: 99%   Weight: 112 kg (246 lb 14.6 oz)   Height: 5' 7" (1.702 m)        Wt Readings from Last 3 Encounters:   06/27/25 1301 112 kg (246 lb 14.6 oz)   04/10/25 1403 110.2 kg (243 lb)   03/28/25 1341 110.2 kg (243 lb)        Physical Exam  Vitals reviewed.   Constitutional:       Appearance: Normal appearance.   HENT:      Head: Normocephalic and atraumatic.   Eyes:      Extraocular Movements: Extraocular movements intact.      Pupils: Pupils are equal, round, and reactive to light.   Cardiovascular:      Rate and Rhythm: Normal rate.      Pulses: Normal pulses.      Heart sounds: Normal heart sounds.   Pulmonary:      Effort: Pulmonary effort is normal. No respiratory distress.      Breath sounds: Normal breath sounds.   Musculoskeletal:         General: Normal range of motion.      Cervical back: Normal range of motion and neck supple.   Skin:     General: Skin is dry.      Capillary Refill: Capillary refill takes less than 2 seconds.   Neurological:      General: No focal deficit present.      Mental Status: She is alert and oriented to person, place, and time. Mental status is at baseline.   Psychiatric:         Mood and Affect: Mood normal.         Behavior: Behavior normal.         Thought Content: Thought content normal.         Current Outpatient Medications   Medication Instructions    aspirin (ECOTRIN) 325 " mg, Daily    atorvastatin (LIPITOR) 20 mg, Oral, Daily    cyanocobalamin (VITAMIN B-12) 100 mcg, Daily    dextroamphetamine-amphetamine (ADDERALL XR) 20 MG 24 hr capsule 20 mg, Oral, Every morning    [START ON 7/27/2025] dextroamphetamine-amphetamine (ADDERALL XR) 20 MG 24 hr capsule 20 mg, Oral, Every morning    [START ON 8/26/2025] dextroamphetamine-amphetamine (ADDERALL XR) 20 MG 24 hr capsule 20 mg, Oral, Every morning    folic acid (FOLVITE) 800 mcg, Daily    medroxyPROGESTERone (DEPO-PROVERA) 150 mg, Intramuscular, Every 12 weeks    metoprolol tartrate (LOPRESSOR) 25 mg, Oral, Daily    multivitamin (ONE DAILY MULTIVITAMIN) per tablet 1 tablet, Daily    SAXagliptin-metformin (KOMBIGLYZE XR) 2.5-1,000 mg TM24 1 each, Oral, 2 times daily    traZODone (DESYREL) 100 mg, Oral, Nightly, Can take 2 tablets if needed           Assessment and Plan     1. Type 2 diabetes mellitus with hyperglycemia, without long-term current use of insulin  -     Hemoglobin A1C; Future  -     Comprehensive Metabolic Panel; Future  -     CBC auto differential; Future; Expected date: 06/27/2025  -     Ambulatory referral/consult to Ophthalmology; Future; Expected date: 07/04/2025    2. Hyperlipidemia associated with type 2 diabetes mellitus  -     Lipid panel; Future; Expected date: 06/27/2025    3. Attention deficit hyperactivity disorder (ADHD), predominantly inattentive type  -     dextroamphetamine-amphetamine (ADDERALL XR) 20 MG 24 hr capsule; Take 1 capsule (20 mg total) by mouth every morning.  Dispense: 30 capsule; Refill: 0  -     dextroamphetamine-amphetamine (ADDERALL XR) 20 MG 24 hr capsule; Take 1 capsule (20 mg total) by mouth every morning.  Dispense: 30 capsule; Refill: 0  -     dextroamphetamine-amphetamine (ADDERALL XR) 20 MG 24 hr capsule; Take 1 capsule (20 mg total) by mouth every morning.  Dispense: 30 capsule; Refill: 0    4. Plantar fasciitis  -     Ambulatory referral/consult to Orthopedics; Future; Expected date:  07/04/2025    5. Insomnia, unspecified type  -     traZODone (DESYREL) 100 MG tablet; Take 1 tablet (100 mg total) by mouth every evening. Can take 2 tablets if needed  Dispense: 90 tablet; Refill: 1        Here for follow up.    For T2DM, checking A1c, continue kombiglyze    For HLD, checking lipid panel, continue lipitor    For ADHD, refilling adderall 20mg XR    For insomnia, starting trazodone 100mg nightly, can increase to 200mg if needed    For right elbow pain, referring to Dr. Garcia    For plantar fasciitis, referring to Dr. Garcia for possible steroid injections as I've exhausted a lot of my conservative measures    The visit today included increased complexity associated with the care of an episodic problem, namely T2DM, that was addressed and managed via longitudinal care.    RTC in 3 months, ADHD        I encouraged the patient to take all medications as prescribed and to keep follow up appointments with their providers. ED precautions given for more concerning issues. Questions were invited and answered. Patient stated they had no other concerns. Follow up sooner if needed.     Serge Last MD  06/27/2025 1:02 PM

## 2025-06-30 ENCOUNTER — RESULTS FOLLOW-UP (OUTPATIENT)
Dept: FAMILY MEDICINE | Facility: CLINIC | Age: 45
End: 2025-06-30

## 2025-07-22 ENCOUNTER — PATIENT MESSAGE (OUTPATIENT)
Dept: FAMILY MEDICINE | Facility: CLINIC | Age: 45
End: 2025-07-22
Payer: COMMERCIAL

## 2025-07-22 DIAGNOSIS — M72.2 PLANTAR FASCIITIS: Primary | ICD-10-CM

## 2025-08-27 ENCOUNTER — TELEPHONE (OUTPATIENT)
Dept: FAMILY MEDICINE | Facility: CLINIC | Age: 45
End: 2025-08-27
Payer: COMMERCIAL

## 2025-08-27 DIAGNOSIS — R00.0 TACHYCARDIA: ICD-10-CM

## 2025-08-27 RX ORDER — METOPROLOL TARTRATE 25 MG/1
25 TABLET, FILM COATED ORAL DAILY
Qty: 90 TABLET | Refills: 3 | Status: SHIPPED | OUTPATIENT
Start: 2025-08-27